# Patient Record
Sex: FEMALE | URBAN - METROPOLITAN AREA
[De-identification: names, ages, dates, MRNs, and addresses within clinical notes are randomized per-mention and may not be internally consistent; named-entity substitution may affect disease eponyms.]

---

## 2020-12-17 ENCOUNTER — PATIENT OUTREACH (OUTPATIENT)
Dept: OTHER | Age: 60
End: 2020-12-17

## 2020-12-17 NOTE — PROGRESS NOTES
[facility] outreach      Patient with Parkinson's Disease/  Deep brain stimulator inserted 8/3/2020  * No phone number or address for this patient. * MMO listing ( high $)       4:15pm  Patient on report as eligible for Case Management. Left discreet message on voicemail with this CM contact information. Will attempt to contact again to offer [address] [place] [address] Management services. Per MMO nurse navigator:      Dylan was admitted to [facility] 8/3/20-8/4/20 for deep brain stimulation for parkinsons. 57 y/o female admitted on operative day. .  VS: 36.6, HR 77, RR 22, /92, 96% on RA  .   Procedure: BL STN DBS stage 1

## 2020-12-18 ENCOUNTER — PATIENT OUTREACH (OUTPATIENT)
Dept: OTHER | Age: 60
End: 2020-12-18

## 2020-12-18 NOTE — PROGRESS NOTES
CCM outreach       Patient with Parkinson's Disease/  Deep brain stimulator inserted 8/3/2020  * No phone number or address for this patient. * MMO listing ( high $)  [employer] - lives in [place]    9:00am  Patient identified as eligible for [address] [place] [address] services. Second telephone outreach attempted. Left discreet voicemail with this CM confidential contact information. Will send UTR letter.     Follow for potential patient engagement

## 2020-12-18 NOTE — LETTER
12/18/2020 8:57 AM 
 
Ms. Parks [place] [address] Dear Ms. Dill, My name is Tova Tariq, Associate Care Manager for [employer] and I have been trying to reach you. The Associate Care Management (ACM) program is a free-of-charge confidential service provided to our associates and their family members covered by the [facility]. The program will provide an associate and his/her family with the [facility] expertise to assist in navigating the health care delivery system, provider services, and their overall care needsso as to assure and improve health care interactions and enhance the quality of life. This program is designed to provide you with the opportunity to have a [employer] [facility] partner with you for the following services: 
 
 1) when you come home from the hospital or emergency room 2) when help is needed to manage your disease 3) when you need assistance coordinating services or appointments 4) when you need additional education, resources or assistance reaching your Be Well Health Program goals/requirements such as Be Well With Diabetes [facility] is dedicated to empowering the good health of its community and improving the quality of care and care experiences for associates and their families. We are committed to safeguarding patient confidentiality and privacy, assuring that every associate has the respect he or she deserves in managing their health. The information shared with your care manager will not be shared with anyone else aside from those you identify as part of your care team, and will only be used to assist you with any identified care needs. Please contact me if you would like this service provided to you. Sincerely, 
 
 
 
 
 
Tova Tariq RN, [place] , [facility]  Associate Care Manager [employer]       Phone:  [phone]     Fax:  [phone]    [email or web address]

## 2021-01-08 ENCOUNTER — PATIENT OUTREACH (OUTPATIENT)
Dept: OTHER | Age: 61
End: 2021-01-08

## 2021-01-08 NOTE — PROGRESS NOTES
CM outreach        Patient with Parkinson's Disease/  Deep brain stimulator inserted 8/3/2020  * MMO listing ( high $) Daryle Hoit - lives in [place]    Resolving current episode for case management due to patient unable to reach. Patient has not been reached after letter sent to patient notifying completion of services due to unable to reach. This writer's contact information and information regarding program services included in materials sent.

## 2021-01-08 NOTE — LETTER
1/8/2021 1:31 PM 
 
Ms. Parks [place] [address] Dear Ms. Dill, My name is Anabela Brito , Associate Care Manager for [facility], and I have been trying to reach you. The Associate Care Management [facility]) program is a free-of-charge, confidential service provided to our employees and their family members covered by the [facility]. I can help you with care transitions such as when you come home from the hospital, when help is needed to manage your disease, or when you need assistance coordinating services or appointments. As healthcare providers, we know that patients do better when they have close follow up with a primary care provider (PCP). I can help you find one that is convenient to you and covered by your insurance. I can also help you understand any after visit instructions, such as what symptoms to watch out for, or any new or changed medications. We can work together using your preferred communication -- telephone, email, [employer]. If you do not have a [employer] account, I can help you request access. Our program is designed to provide you with the opportunity to have a [facility] partner with you for your healthcare needs. Due to not being able to reach you, I am closing out the current program, but will remain available to you should you have any questions. Please contact me at the below number if I can provide you with assistance for any of the above services. Sincerely, 
 
 
 
 
Anabela Brito RN, Melissa Bonilla, [facility]  Associate Care Manager [employer]       Phone:  [phone]     Fax:  [phone]    [email or web address]